# Patient Record
Sex: FEMALE | Race: ASIAN | URBAN - METROPOLITAN AREA
[De-identification: names, ages, dates, MRNs, and addresses within clinical notes are randomized per-mention and may not be internally consistent; named-entity substitution may affect disease eponyms.]

---

## 2023-01-16 ENCOUNTER — OFFICE VISIT (OUTPATIENT)
Dept: URBAN - METROPOLITAN AREA CLINIC 59 | Facility: CLINIC | Age: 38
End: 2023-01-16

## 2023-01-16 DIAGNOSIS — H11.153 PINGUECULA, BILATERAL: Primary | ICD-10-CM

## 2023-01-16 DIAGNOSIS — Z98.890 OTHER SPECIFIED POSTPROCEDURAL STATES: ICD-10-CM

## 2023-01-16 PROCEDURE — 99203 OFFICE O/P NEW LOW 30 MIN: CPT | Performed by: OPTOMETRIST

## 2023-01-16 NOTE — IMPRESSION/PLAN
Impression: Pinguecula, bilateral: H11.153. L>R Plan: Patient educated regarding findings. Patient reassured no evidence of infection. Recommend artificial tears as needed and good UV protection. Observe.

## 2023-01-16 NOTE — IMPRESSION/PLAN
Impression: Other specified postprocedural states: Z98.890. Bilateral. Plan: S/P LASIK OU.   Recommend patient RTC for CEE at her convenience